# Patient Record
Sex: MALE | Race: WHITE | NOT HISPANIC OR LATINO | Employment: OTHER | URBAN - METROPOLITAN AREA
[De-identification: names, ages, dates, MRNs, and addresses within clinical notes are randomized per-mention and may not be internally consistent; named-entity substitution may affect disease eponyms.]

---

## 2021-10-04 ENCOUNTER — OFFICE VISIT (OUTPATIENT)
Dept: URGENT CARE | Facility: URGENT CARE | Age: 36
End: 2021-10-04

## 2021-10-04 VITALS
TEMPERATURE: 98.1 F | RESPIRATION RATE: 16 BRPM | DIASTOLIC BLOOD PRESSURE: 86 MMHG | OXYGEN SATURATION: 97 % | WEIGHT: 300.6 LBS | HEART RATE: 82 BPM | SYSTOLIC BLOOD PRESSURE: 131 MMHG

## 2021-10-04 DIAGNOSIS — R07.9 CHEST PAIN, UNSPECIFIED TYPE: Primary | ICD-10-CM

## 2021-10-04 PROCEDURE — 99204 OFFICE O/P NEW MOD 45 MIN: CPT | Performed by: NURSE PRACTITIONER

## 2021-10-04 PROCEDURE — 93000 ELECTROCARDIOGRAM COMPLETE: CPT | Performed by: NURSE PRACTITIONER

## 2021-10-04 ASSESSMENT — PAIN SCALES - GENERAL: PAINLEVEL: NO PAIN (0)

## 2021-10-04 NOTE — PROGRESS NOTES
SUBJECTIVE:  Nathaniel Scott is a 36 year old male who presents to the office with the CC of chest pain.  Patient complains of chest pain and chest pressure/discomfort, some dyspnea.  The pain is characterized as moderate and intermittent achey and pressure located left sternal with radiation to none. Symptoms began in the middle of the night  Pain is exacerbated by no known provoking events.  Pain is relieved by none.  Cardiac risk factors: obesity    No past medical history on file.    No current outpatient medications on file.    Social History     Tobacco Use     Smoking status: Current Some Day Smoker     Types: Cigarettes     Smokeless tobacco: Former User   Substance Use Topics     Alcohol use: Not on file     Fam hx reviewed, nothing pertinent to visit    ROS:Review of systems negative except as stated above.    EXAM:  /86   Pulse 82   Temp 98.1  F (36.7  C) (Tympanic)   Resp 16   Wt 136.4 kg (300 lb 9.6 oz)   SpO2 97%   GENERAL APPEARANCE: alert and no distress  RESP: lungs clear to auscultation - no rales, rhonchi or wheezes  CV: regular rates and rhythm, normal S1 S2, no murmur noted  SKIN: no suspicious lesions or rashes     Office EKG demonstrates:  appears normal, NSR,normal axis, normal intervals, no acute ST/T changes c/w ischemia,no LVH by voltage criteria,unchanged from previous tracings    Assessment  / IMPRESSION  (R07.9) Chest pain, unspecified type  (primary encounter diagnosis)    Plan:   Discussed EKG results nothing abnormal seen on rhythm strip but he should be seen in ER for chest pain unknown concerning symptoms   He has no cold symptoms recent living *muscular doesn't sound like heartburn  He is stable and will drive himself  Follow up with pcp as  needed    SAKSHI Kaminski CNP

## 2023-11-19 ENCOUNTER — HEALTH MAINTENANCE LETTER (OUTPATIENT)
Age: 38
End: 2023-11-19